# Patient Record
Sex: FEMALE | Employment: UNEMPLOYED
[De-identification: names, ages, dates, MRNs, and addresses within clinical notes are randomized per-mention and may not be internally consistent; named-entity substitution may affect disease eponyms.]

---

## 2024-05-17 ENCOUNTER — TELEPHONE (OUTPATIENT)
Facility: HOSPITAL | Age: 39
End: 2024-05-17

## 2024-05-17 ENCOUNTER — HOSPITAL ENCOUNTER (OUTPATIENT)
Facility: HOSPITAL | Age: 39
Setting detail: RECURRING SERIES
Discharge: HOME | End: 2024-05-20
Payer: OTHER GOVERNMENT

## 2024-05-17 PROCEDURE — 97110 THERAPEUTIC EXERCISES: CPT

## 2024-05-17 PROCEDURE — 97161 PT EVAL LOW COMPLEX 20 MIN: CPT

## 2024-05-17 PROCEDURE — 97535 SELF CARE MNGMENT TRAINING: CPT

## 2024-05-17 NOTE — TELEPHONE ENCOUNTER
Called to check in as it was 5+ min after eval & pt said she is on her way as she was taking care of her sick kid. Called back to make sure she could arrive no later than 10:20 otherwise she will have to RS & pt said she is getting ready to walk in the door.

## 2024-05-17 NOTE — PROGRESS NOTES
body structure, function, activity limitation and / or participation in recreation  ;Presentation:  LOW Complexity : Stable, uncomplicated  ;Clinical Decision Making:  FOTO: 62 = MEDIUM Complexity : FOTO score of 26-74 FOTO score = an established functional score where 100 = no disability  Overall Complexity Rating: LOW     Problem List: pain affecting function, decrease ROM, decrease strength, decrease ADL/functional abilities, decrease activity tolerance, and decrease flexibility/joint mobility   Treatment Plan may include any combination of the followin Therapeutic Exercise, 58374 Neuromuscular Re-Education, 34924 Manual Therapy, 06088 Therapeutic Activity, 43715 Self Care/Home Management, and (Elective Self Pay) Needle Insertion w/o Injection (1 or 2 muscles), (3+ muscles)    Patient / Family readiness to learn indicated by: asking questions, trying to perform skills, and interest  Persons(s) to be included in education: patient (P)  Barriers to Learning/Limitations: None  Measures taken if barriers to learning present: n/a  Patient Goal (s): \"Not have pain when I raise my arm.\"   Patient Self Reported Health Status: good  Rehabilitation Potential: good    Short Term Goals: To be accomplished in 4 weeks:  Patient will be independent and compliant with HEP to progress toward goals and restore functional mobility.   Eval Status: issued at eval     Patient will improve left shoulder flexion MMT to 5/5 to improve ease of OH reach.  Eval Status: 4+/5 with pain  FOTO score = an established functional score where 100 = no disability     Pt will demonstrate DWAINE to T2 on left side void of pain provocation to improve ease of daily task performance.  Eval Status: T1 with discomfort     Long Term Goals: To be accomplished in 8 weeks:  Patient will improve FOTO score to 71 points to indicate significant improvement in functional status.  Eval Status: FOTO 62  FOTO score = an established functional score where 100 = 
tolerated  []  Discharge due to :  []  Other:    Anthony Burns, PT    5/17/2024    10:20 AM    No future appointments.

## 2024-05-22 ENCOUNTER — HOSPITAL ENCOUNTER (OUTPATIENT)
Facility: HOSPITAL | Age: 39
Setting detail: RECURRING SERIES
Discharge: HOME | End: 2024-05-25
Payer: OTHER GOVERNMENT

## 2024-05-22 PROCEDURE — 97112 NEUROMUSCULAR REEDUCATION: CPT

## 2024-05-22 PROCEDURE — 97530 THERAPEUTIC ACTIVITIES: CPT

## 2024-05-22 PROCEDURE — 97110 THERAPEUTIC EXERCISES: CPT

## 2024-05-22 NOTE — PROGRESS NOTES
PHYSICAL / OCCUPATIONAL THERAPY - DAILY TREATMENT NOTE (updated )    Patient Name: Norma Pinzon    Date: 2024    : 1985  Insurance: Payor:  EAST / Plan:  EAST PRIME / Product Type: *No Product type* /      Patient  verified Yes     Visit #   Current / Total 2 16   Time   In / Out 0930 1017   Pain   In / Out 0.5 0-2   Subjective Functional Status/Changes: \"I don't have much pain at rest, but certain motions are still quite painful.\"   Changes to:  Meds, Allergies, Med Hx, Sx Hx?  If yes, update Summary List no       TREATMENT AREA =  Pain in left shoulder [M25.512]    OBJECTIVE    Therapeutic Procedures:  Tx Min Billable or 1:1 Min (if diff from Tx Min) Procedure, Rationale, Specifics   25  02210 Therapeutic Exercise (timed):  increase ROM, strength, coordination, balance, and proprioception to improve patient's ability to progress to PLOF and address remaining functional goals. (see flow sheet as applicable)     Details if applicable:       12  86619 Therapeutic Activity (timed):  use of dynamic activities replicating functional movements to increase ROM, strength, coordination, balance, and proprioception in order to improve patient's ability to progress to PLOF and address remaining functional goals.  (see flow sheet as applicable)     Details if applicable:     10  73592 Neuromuscular Re-Education (timed):  improve balance, coordination, kinesthetic sense, posture, core stability and proprioception to improve patient's ability to develop conscious control of individual muscles and awareness of position of extremities in order to progress to PLOF and address remaining functional goals. (see flow sheet as applicable)     Details if applicable:     47  [facility] Totals Reminder: bill using total billable min of TIMED therapeutic procedures (example: do not include dry needle or estim unattended, both untimed codes, in totals to left)  8-22 min = 1 unit; 23-37 min = 2 units; 38-52 min =

## 2024-05-31 ENCOUNTER — HOSPITAL ENCOUNTER (OUTPATIENT)
Facility: HOSPITAL | Age: 39
Setting detail: RECURRING SERIES
End: 2024-05-31
Payer: OTHER GOVERNMENT

## 2024-05-31 PROCEDURE — 97112 NEUROMUSCULAR REEDUCATION: CPT

## 2024-05-31 PROCEDURE — 97530 THERAPEUTIC ACTIVITIES: CPT

## 2024-05-31 PROCEDURE — 97110 THERAPEUTIC EXERCISES: CPT

## 2024-05-31 NOTE — PROGRESS NOTES
left)  8-22 min = 1 unit; 23-37 min = 2 units; 38-52 min = 3 units; 53-67 min = 4 units; 68-82 min = 5 units   Total Total       TOTAL TREATMENT TIME:        49       [x]  Patient Education billed concurrently with other procedures   [x] Review HEP    [] Progressed/Changed HEP, detail:    [] Other detail:       Objective Information/Functional Measures/Assessment    Added inferior glide stretch to program due to painful arc still present left shoulder abduction. Provided patient resistance band for HEP and written copy of all exercises. Reviewed exercises in detail and reinforced to patient importance of developing consistency with HEP.    Patient will continue to benefit from skilled PT / OT services to modify and progress therapeutic interventions, analyze and address functional mobility deficits, analyze and address ROM deficits, analyze and address strength deficits, analyze and address soft tissue restrictions, analyze and cue for proper movement patterns, analyze and modify for postural abnormalities, analyze and address imbalance/dizziness, and instruct in home and community integration to address functional deficits and attain remaining goals.    Progress toward goals / Updated goals:  []  See Progress Note/Recertification    Short Term Goals: To be accomplished in 4 weeks:  Patient will be independent and compliant with HEP to progress toward goals and restore functional mobility.   Eval Status: issued at eval  Current: patient reports consistent compliance with initial HEP every other day.   5/22/2024     Patient will improve left shoulder flexion MMT to 5/5 to improve ease of OH reach.  Eval Status: 4+/5 with pain     Pt will demonstrate DWAINE to T2 on left side void of pain provocation to improve ease of daily task performance.  Eval Status: T1 with discomfort  Current: T2 with slight discomfort. 5/31/2024     Long Term Goals: To be accomplished in 8 weeks:  Patient will improve FOTO score to 71 points to  indicate significant improvement in functional status.  Eval Status: FOTO 62  FOTO score = an established functional score where 100 = no disability     Pt will have 5/5 left shoulder ER strength to return to goals of improve ease of overhead reach and swimming.  Eval Status: 4/5 with pain     Pt will report ability to return to normal swimming void of pain provocation to improve QOL  Eval status:  pain with freestyle stroke per pt    PLAN  Yes  Continue plan of care  []  Upgrade activities as tolerated  []  Discharge due to:   []  Other:    Jeremy Cowan, PT    5/31/2024    10:58 AM    Future Appointments   Date Time Provider Department Center   6/4/2024  9:30 AM Anthony Burns, PT [facility]   6/6/2024  9:30 AM Jeremy Cowan, PT [facility]   6/11/2024  9:30 AM Anthony Burns, PT [facility]   6/13/2024  9:30 AM Anthony Burns, PT [facility]   6/18/2024  9:30 AM Anthony Burns, PT [facility]   6/20/2024  9:30 AM Anthony Burns, PT [facility]   6/25/2024  9:30 AM Anthony Burns, PT [facility]   6/27/2024  9:30 AM Anthony Burns, PT [facility]   7/2/2024  9:30 AM Anthony Burns, PT [facility]   7/9/2024  9:30 AM Anthony Burns, PT [facility]

## 2024-06-04 ENCOUNTER — HOSPITAL ENCOUNTER (OUTPATIENT)
Facility: HOSPITAL | Age: 39
Setting detail: RECURRING SERIES
Discharge: HOME | End: 2024-06-07
Payer: OTHER GOVERNMENT

## 2024-06-04 PROCEDURE — 97112 NEUROMUSCULAR REEDUCATION: CPT

## 2024-06-04 PROCEDURE — 97530 THERAPEUTIC ACTIVITIES: CPT

## 2024-06-04 PROCEDURE — 97110 THERAPEUTIC EXERCISES: CPT

## 2024-06-04 NOTE — PROGRESS NOTES
PHYSICAL / OCCUPATIONAL THERAPY - DAILY TREATMENT NOTE (updated )    Patient Name: Norma Pinzon    Date: 2024    : 1985  Insurance: Payor: [employer] EAST / Plan:  EAST PRIME / Product Type: *No Product type* /      Patient  verified Yes     Visit #   Current / Total 4 15   Time   In / Out 9:32 am 10:25 am   Pain   In / Out 1 0   Subjective Functional Status/Changes: \"Its doing better. I still get some catching when I'm raising my arm up.\"   Changes to:  Meds, Allergies, Med Hx, Sx Hx?  If yes, update Summary List no       TREATMENT AREA =  Pain in left shoulder [M25.512]    OBJECTIVE    Therapeutic Procedures:  Tx Min Billable or 1:1 Min (if diff from Tx Min) Procedure, Rationale, Specifics   29  10741 Therapeutic Exercise (timed):  increase ROM, strength, coordination, balance, and proprioception to improve patient's ability to progress to PLOF and address remaining functional goals. (see flow sheet as applicable)     Details if applicable:       12  29896 Therapeutic Activity (timed):  use of dynamic activities replicating functional movements to increase ROM, strength, coordination, balance, and proprioception in order to improve patient's ability to progress to PLOF and address remaining functional goals.  (see flow sheet as applicable)     Details if applicable:     12  85121 Neuromuscular Re-Education (timed):  improve balance, coordination, kinesthetic sense, posture, core stability and proprioception to improve patient's ability to develop conscious control of individual muscles and awareness of position of extremities in order to progress to PLOF and address remaining functional goals. (see flow sheet as applicable)     Details if applicable:     53  [facility] Totals Reminder: bill using total billable min of TIMED therapeutic procedures (example: do not include dry needle or estim unattended, both untimed codes, in totals to left)  8-22 min = 1 unit; 23-37 min = 2 units; 38-52 min = 3

## 2024-06-06 ENCOUNTER — HOSPITAL ENCOUNTER (OUTPATIENT)
Facility: HOSPITAL | Age: 39
Setting detail: RECURRING SERIES
Discharge: HOME | End: 2024-06-09
Payer: OTHER GOVERNMENT

## 2024-06-06 PROCEDURE — 97112 NEUROMUSCULAR REEDUCATION: CPT

## 2024-06-06 PROCEDURE — 97530 THERAPEUTIC ACTIVITIES: CPT

## 2024-06-06 PROCEDURE — 97110 THERAPEUTIC EXERCISES: CPT

## 2024-06-06 NOTE — PROGRESS NOTES
untimed codes, in totals to left)  8-22 min = 1 unit; 23-37 min = 2 units; 38-52 min = 3 units; 53-67 min = 4 units; 68-82 min = 5 units   Total Total       TOTAL TREATMENT TIME:        65       [x]  Patient Education billed concurrently with other procedures   [x] Review HEP    [] Progressed/Changed HEP, detail:    [] Other detail:       Objective Information/Functional Measures/Assessment    Patient reports feeling stronger and less pain. Weights were progressed with ER and added resistance band walks. Patient did not report any increase in pain and had no difficulty completing but did experience additional fatigue when moved on to I, Y, T, W. Last set reduced resistance to 0 for last set. Patient was unable to maintain proper form in final set of \"Y\" lower trapezius exercise, swinging arm rapidly due to fatigue.    Patient will continue to benefit from skilled PT / OT services to modify and progress therapeutic interventions, analyze and address functional mobility deficits, analyze and address ROM deficits, analyze and address strength deficits, analyze and address soft tissue restrictions, analyze and cue for proper movement patterns, analyze and modify for postural abnormalities, analyze and address imbalance/dizziness, and instruct in home and community integration to address functional deficits and attain remaining goals.    Progress toward goals / Updated goals:  []  See Progress Note/Recertification    Short Term Goals: To be accomplished in 4 weeks:  Patient will be independent and compliant with HEP to progress toward goals and restore functional mobility.   Eval Status: issued at eval  Current: patient reports consistent compliance with initial HEP every other day.   5/22/2024     Patient will improve left shoulder flexion MMT to 5/5 to improve ease of OH reach.  Eval Status: 4+/5 with pain     Pt will demonstrate DWAINE to T2 on left side void of pain provocation to improve ease of daily task

## 2024-06-11 ENCOUNTER — APPOINTMENT (OUTPATIENT)
Facility: HOSPITAL | Age: 39
End: 2024-06-11
Payer: OTHER GOVERNMENT

## 2024-06-13 ENCOUNTER — HOSPITAL ENCOUNTER (OUTPATIENT)
Facility: HOSPITAL | Age: 39
Setting detail: RECURRING SERIES
Discharge: HOME | End: 2024-06-16
Payer: OTHER GOVERNMENT

## 2024-06-13 PROCEDURE — 97110 THERAPEUTIC EXERCISES: CPT

## 2024-06-13 PROCEDURE — 97112 NEUROMUSCULAR REEDUCATION: CPT

## 2024-06-13 PROCEDURE — 97530 THERAPEUTIC ACTIVITIES: CPT

## 2024-06-13 NOTE — PROGRESS NOTES
PHYSICAL / OCCUPATIONAL THERAPY - DAILY TREATMENT NOTE (updated )    Patient Name: Norma Pinzon    Date: 2024    : 1985  Insurance: Payor:  EAST / Plan:  EAST PRIME / Product Type: *No Product type* /      Patient  verified Yes     Visit #   Current / Total 6 15   Time   In / Out 9:35 am 10:30 am   Pain   In / Out 0 @ rest, 2 with overhead movement 0   Subjective Functional Status/Changes: Pt reports her shoulder has been \"doing better\" but notes that she still has some slight pain when raising her arm up over head.   Changes to:  Meds, Allergies, Med Hx, Sx Hx?  If yes, update Summary List no       TREATMENT AREA =  Pain in left shoulder [M25.512]    OBJECTIVE    Therapeutic Procedures:  Tx Min Billable or 1:1 Min (if diff from Tx Min) Procedure, Rationale, Specifics   25  60538 Therapeutic Exercise (timed):  increase ROM, strength, coordination, balance, and proprioception to improve patient's ability to progress to PLOF and address remaining functional goals. (see flow sheet as applicable)     Details if applicable:       15  62320 Therapeutic Activity (timed):  use of dynamic activities replicating functional movements to increase ROM, strength, coordination, balance, and proprioception in order to improve patient's ability to progress to PLOF and address remaining functional goals.  (see flow sheet as applicable)     Details if applicable:     15  39390 Neuromuscular Re-Education (timed):  improve balance, coordination, kinesthetic sense, posture, core stability and proprioception to improve patient's ability to develop conscious control of individual muscles and awareness of position of extremities in order to progress to PLOF and address remaining functional goals. (see flow sheet as applicable)     Details if applicable:     55  [facility] Totals Reminder: bill using total billable min of TIMED therapeutic procedures (example: do not include dry needle or estim unattended, both

## 2024-06-13 NOTE — PROGRESS NOTES
[facility]  [address] [address]                                                                                      Phone: [phone]   Fax: [phone]    Progress Note  Patient name: Norma Pinzon Start of Care: 2024   Referral source: Amy Matthews PA : 1985               Medical Diagnosis: Pain in left shoulder [M25.512]      Onset Date:~3/1/2024               Treatment Diagnosis:  M25.512  LEFT SHOULDER PAIN                                          Prior Hospitalization: see medical history Provider#: [id number]   Medications: Verified on Patient Summary List      Comorbidities: hx hip dysplasia with right and x 2 left hip surgeries, 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, brain tumor 2016 with resection and some subsequent neck problems due to surgery, microdiscectomy 2019 in L/S L5-S1, breast cancer 1.5 years ago with double mastectomy with reconstruction afterwards with implants (5 surgeries for reconstruction overall)   Prior Level of Function:  functionally independent, no AD, semi-active lifestyle     Visits from Start of Care: 6    Missed Visits: 0    Updated Goals/Measure of Progress: To be achieved in 4 weeks:    Short Term Goals: To be accomplished in 4 weeks:  Patient will be independent and compliant with HEP to progress toward goals and restore functional mobility.   Eval Status: issued at eval  Current: patient reports consistent compliance with initial HEP every other day.   2024     Patient will improve left shoulder flexion MMT to 5/5 to improve ease of OH reach.  Eval Status: 4+/5 with pain     Pt will demonstrate DWAINE to T2 on left side void of pain provocation to improve ease of daily task performance.  Eval Status: T1 with discomfort  Current: T3 bilat void of pain 24     Long Term Goals: To be accomplished in 8 weeks:  Patient will improve FOTO score to 71 points to indicate significant improvement in

## 2024-06-18 ENCOUNTER — HOSPITAL ENCOUNTER (OUTPATIENT)
Facility: HOSPITAL | Age: 39
Setting detail: RECURRING SERIES
Discharge: HOME | End: 2024-06-21
Payer: OTHER GOVERNMENT

## 2024-06-18 PROCEDURE — 97112 NEUROMUSCULAR REEDUCATION: CPT

## 2024-06-18 PROCEDURE — 97110 THERAPEUTIC EXERCISES: CPT

## 2024-06-18 PROCEDURE — 97530 THERAPEUTIC ACTIVITIES: CPT

## 2024-06-18 NOTE — PROGRESS NOTES
unattended, both untimed codes, in totals to left)  8-22 min = 1 unit; 23-37 min = 2 units; 38-52 min = 3 units; 53-67 min = 4 units; 68-82 min = 5 units   Total Total     TOTAL TREATMENT TIME:        53     [x]  Patient Education billed concurrently with other procedures   [x] Review HEP    [] Progressed/Changed HEP, detail:    [] Other detail:       Objective Information/Functional Measures/Assessment    Pt tolerated treatment well with reduction of pain post treatment. Demonstrates full shoulder flexion AROM though notes minimal post shoulder pain with terminal left shoulder flexion AROM against gravity. Progressing with shoulder strengthening void of adverse response with minimal cues for technique and some signs of fatigue post treatment noted. Treatment duration limited as pt had to leave for personal reasons. Continue progressing with shoulder strength and stabilization as tolerated.    Patient will continue to benefit from skilled PT / OT services to modify and progress therapeutic interventions, analyze and address functional mobility deficits, analyze and address ROM deficits, analyze and address strength deficits, analyze and address soft tissue restrictions, analyze and cue for proper movement patterns, and instruct in home and community integration to address functional deficits and attain remaining goals.    Progress toward goals / Updated goals:  []  See Progress Note/Recertification    Updated Goals/Measure of Progress: To be achieved in 4 weeks:     Short Term Goals: To be accomplished in 4 weeks:  Patient will be independent and compliant with HEP to progress toward goals and restore functional mobility.   Eval Status: issued at eval  Current: patient reports consistent compliance with initial HEP every other day.   5/22/2024     Patient will improve left shoulder flexion MMT to 5/5 to improve ease of OH reach.  Eval Status: 4+/5 with pain  Current: 4+/5 void of pain, improving 6/18/24     Pt will

## 2024-06-20 ENCOUNTER — HOSPITAL ENCOUNTER (OUTPATIENT)
Facility: HOSPITAL | Age: 39
Setting detail: RECURRING SERIES
Discharge: HOME | End: 2024-06-23
Payer: OTHER GOVERNMENT

## 2024-06-20 PROCEDURE — 97112 NEUROMUSCULAR REEDUCATION: CPT

## 2024-06-20 PROCEDURE — 97110 THERAPEUTIC EXERCISES: CPT

## 2024-06-20 PROCEDURE — 97530 THERAPEUTIC ACTIVITIES: CPT

## 2024-06-20 NOTE — PROGRESS NOTES
PHYSICAL / OCCUPATIONAL THERAPY - DAILY TREATMENT NOTE     Patient Name: Norma Pinzon    Date: 2024    : 1985  Insurance: Payor: [employer] EAST / Plan:  EAST PRIME / Product Type: *No Product type* /      Patient  verified Yes     Visit #   Current / Total 8 15   Time   In / Out 9:20 am 10:15 am   Pain   In / Out 0 0   Subjective Functional Status/Changes: Pt reports her shoulder is doing ok today.   Changes to:  Allergies, Med Hx, Sx Hx?   yes       TREATMENT AREA =  Pain in left shoulder [M25.512]    OBJECTIVE    Therapeutic Procedures:  Tx Min Billable or 1:1 Min (if diff from Tx Min) Procedure, Rationale, Specifics   30  40988 Therapeutic Exercise (timed):  increase ROM, strength, coordination, balance, and proprioception to improve patient's ability to progress to PLOF and address remaining functional goals. (see flow sheet as applicable)    Details if applicable:       15  92566 Neuromuscular Re-Education (timed):  improve balance, coordination, kinesthetic sense, posture, core stability and proprioception to improve patient's ability to develop conscious control of individual muscles and awareness of position of extremities in order to progress to PLOF and address remaining functional goals. (see flow sheet as applicable)    Details if applicable:     10  21148 Therapeutic Activity (timed):  use of dynamic activities replicating functional movements to increase ROM, strength, coordination, balance, and proprioception in order to improve patient's ability to progress to PLOF and address remaining functional goals.  (see flow sheet as applicable)     Details if applicable:           Details if applicable:            Details if applicable:     55  [facility] Totals Reminder: bill using total billable min of TIMED therapeutic procedures (example: do not include dry needle or estim unattended, both untimed codes, in totals to left)  8-22 min = 1 unit; 23-37 min = 2 units; 38-52 min = 3 units; 53-67  no

## 2024-06-25 ENCOUNTER — HOSPITAL ENCOUNTER (OUTPATIENT)
Facility: HOSPITAL | Age: 39
Setting detail: RECURRING SERIES
Discharge: HOME | End: 2024-06-28
Payer: OTHER GOVERNMENT

## 2024-06-25 PROCEDURE — 97110 THERAPEUTIC EXERCISES: CPT

## 2024-06-25 PROCEDURE — 97530 THERAPEUTIC ACTIVITIES: CPT

## 2024-06-25 PROCEDURE — 97112 NEUROMUSCULAR REEDUCATION: CPT

## 2024-06-25 NOTE — PROGRESS NOTES
PHYSICAL / OCCUPATIONAL THERAPY - DAILY TREATMENT NOTE     Patient Name: Norma Pinzon    Date: 2024    : 1985  Insurance: Payor: [employer] EAST / Plan:  EAST PRIME / Product Type: *No Product type* /      Patient  verified Yes     Visit #   Current / Total 9 15   Time   In / Out 9:31 am 10:25 am   Pain   In / Out 0 0   Subjective Functional Status/Changes: Pt reports her shoulder is ok today, though she notes she gets a catching pain sensation in her right shoulder with performance of wall slides with resistance.   Changes to:  Allergies, Med Hx, Sx Hx?   yes       TREATMENT AREA =  Pain in left shoulder [M25.512]    OBJECTIVE    Therapeutic Procedures:  Tx Min Billable or 1:1 Min (if diff from Tx Min) Procedure, Rationale, Specifics   30  64454 Therapeutic Exercise (timed):  increase ROM, strength, coordination, balance, and proprioception to improve patient's ability to progress to PLOF and address remaining functional goals. (see flow sheet as applicable)    Details if applicable:       14  14599 Neuromuscular Re-Education (timed):  improve balance, coordination, kinesthetic sense, posture, core stability and proprioception to improve patient's ability to develop conscious control of individual muscles and awareness of position of extremities in order to progress to PLOF and address remaining functional goals. (see flow sheet as applicable)    Details if applicable:     10  88423 Therapeutic Activity (timed):  use of dynamic activities replicating functional movements to increase ROM, strength, coordination, balance, and proprioception in order to improve patient's ability to progress to PLOF and address remaining functional goals.  (see flow sheet as applicable)     Details if applicable:           Details if applicable:            Details if applicable:     54  [facility] Totals Reminder: bill using total billable min of TIMED therapeutic procedures (example: do not include dry needle or estim

## 2024-06-27 ENCOUNTER — HOSPITAL ENCOUNTER (OUTPATIENT)
Facility: HOSPITAL | Age: 39
Setting detail: RECURRING SERIES
Discharge: HOME | End: 2024-06-30
Payer: OTHER GOVERNMENT

## 2024-06-27 PROCEDURE — 97112 NEUROMUSCULAR REEDUCATION: CPT

## 2024-06-27 PROCEDURE — 97530 THERAPEUTIC ACTIVITIES: CPT

## 2024-06-27 PROCEDURE — 97110 THERAPEUTIC EXERCISES: CPT

## 2024-06-27 NOTE — PROGRESS NOTES
62  Current: will complete in upcoming appts 6/13/24  FOTO score = an established functional score where 100 = no disability     Pt will have 5/5 left shoulder ER strength to return to goals of improve ease of overhead reach and swimming.  Eval Status: 4/5 with pain   Current: 4+/5 bilat void of pain 6/13/24     Pt will report ability to return to normal swimming void of pain provocation to improve QOL  Eval status:  pain with freestyle stroke per pt  Current: not yet attempted return to swimming 6/18/24    PLAN  Yes  Continue plan of care  [x]  Upgrade activities as tolerated  []  Discharge due to :  []  Other:    Anthony Burns, PT    6/27/2024    9:47 AM    Future Appointments   Date Time Provider Department Center   7/2/2024  9:30 AM Anthony Burns, PT [facility]   7/9/2024  9:30 AM Anthony Burns, PT [facility]

## 2024-07-02 ENCOUNTER — HOSPITAL ENCOUNTER (OUTPATIENT)
Facility: HOSPITAL | Age: 39
Setting detail: RECURRING SERIES
Discharge: HOME | End: 2024-07-05
Payer: OTHER GOVERNMENT

## 2024-07-02 PROCEDURE — 97112 NEUROMUSCULAR REEDUCATION: CPT

## 2024-07-02 PROCEDURE — 97110 THERAPEUTIC EXERCISES: CPT

## 2024-07-02 PROCEDURE — 97530 THERAPEUTIC ACTIVITIES: CPT

## 2024-07-02 NOTE — PROGRESS NOTES
PHYSICAL / OCCUPATIONAL THERAPY - DAILY TREATMENT NOTE     Patient Name: Norma Pinzon    Date: 2024    : 1985  Insurance: Payor:  EAST / Plan:  [employer]IME / Product Type: *No Product type* /      Patient  verified Yes     Visit #   Current / Total 11 15   Time   In / Out 9:35 am 10:37 am   Pain   In / Out 1 0   Subjective Functional Status/Changes: \"I guess its getting better I just wish it would take less time.\"   Changes to:  Allergies, Med Hx, Sx Hx?   yes       TREATMENT AREA =  Pain in left shoulder [M25.512]    OBJECTIVE    Therapeutic Procedures:  Tx Min Billable or 1:1 Min (if diff from Tx Min) Procedure, Rationale, Specifics   25  99162 Therapeutic Exercise (timed):  increase ROM, strength, coordination, balance, and proprioception to improve patient's ability to progress to PLOF and address remaining functional goals. (see flow sheet as applicable)    Details if applicable:         36687 Neuromuscular Re-Education (timed):  improve balance, coordination, kinesthetic sense, posture, core stability and proprioception to improve patient's ability to develop conscious control of individual muscles and awareness of position of extremities in order to progress to PLOF and address remaining functional goals. (see flow sheet as applicable)    Details if applicable:       95711 Therapeutic Activity (timed):  use of dynamic activities replicating functional movements to increase ROM, strength, coordination, balance, and proprioception in order to improve patient's ability to progress to PLOF and address remaining functional goals.  (see flow sheet as applicable)     Details if applicable:           Details if applicable:            Details if applicable:     67  [facility] Totals Reminder: bill using total billable min of TIMED therapeutic procedures (example: do not include dry needle or estim unattended, both untimed codes, in totals to left)  8-22 min = 1 unit; 23-37 min = 2 units;

## 2024-07-09 ENCOUNTER — HOSPITAL ENCOUNTER (OUTPATIENT)
Facility: HOSPITAL | Age: 39
Setting detail: RECURRING SERIES
Discharge: HOME | End: 2024-07-12
Payer: OTHER GOVERNMENT

## 2024-07-09 PROCEDURE — 97530 THERAPEUTIC ACTIVITIES: CPT

## 2024-07-09 PROCEDURE — 97112 NEUROMUSCULAR REEDUCATION: CPT

## 2024-07-09 PROCEDURE — 97110 THERAPEUTIC EXERCISES: CPT

## 2024-07-09 NOTE — PROGRESS NOTES
in upcoming appts 6/13/24  FOTO score = an established functional score where 100 = no disability     Pt will have 5/5 left shoulder ER strength to return to goals of improve ease of overhead reach and swimming.  Eval Status: 4/5 with pain   Current: progressing with shoulder strength interventions, ER 5-/5 7/9/24     Pt will report ability to return to normal swimming void of pain provocation to improve QOL  Eval status:  pain with freestyle stroke per pt  Current: not yet attempted return to swimming 6/18/24    PLAN  Yes  Continue plan of care  [x]  Upgrade activities as tolerated  []  Discharge due to :  []  Other:    Anthony Burns, PT    7/9/2024    9:33 AM    No future appointments.

## 2024-07-11 ENCOUNTER — HOSPITAL ENCOUNTER (OUTPATIENT)
Facility: HOSPITAL | Age: 39
Setting detail: RECURRING SERIES
Discharge: HOME | End: 2024-07-14
Payer: OTHER GOVERNMENT

## 2024-07-11 PROCEDURE — 97110 THERAPEUTIC EXERCISES: CPT

## 2024-07-11 PROCEDURE — 97112 NEUROMUSCULAR REEDUCATION: CPT

## 2024-07-11 PROCEDURE — 97530 THERAPEUTIC ACTIVITIES: CPT

## 2024-07-11 NOTE — PROGRESS NOTES
[facility]  [address] [address]                                                                                      Phone: [phone]   Fax: [phone]    Progress Note  Patient name: Norma Pinzon Start of Care: 2024   Referral source: Amy Matthews PA : 1985               Medical Diagnosis: Pain in left shoulder [M25.512]      Onset Date:~3/1/2024               Treatment Diagnosis:  M25.512  LEFT SHOULDER PAIN                                          Prior Hospitalization: see medical history Provider#: [id number]   Medications: Verified on Patient Summary List      Comorbidities: hx hip dysplasia with right and x 2 left hip surgeries, 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, brain tumor 2016 with resection and some subsequent neck problems due to surgery, microdiscectomy 2019 in L/S L5-S1, breast cancer 1.5 years ago with double mastectomy with reconstruction afterwards with implants (5 surgeries for reconstruction overall)   Prior Level of Function:  functionally independent, no AD, semi-active lifestyle     Visits from Start of Care: 13    Missed Visits: 0    Updated Goals/Measure of Progress: To be achieved in 4 weeks:     Short Term Goals: To be accomplished in 4 weeks:  Patient will be independent and compliant with HEP to progress toward goals and restore functional mobility.   Eval Status: issued at eval  Current: patient reports more consistent compliance with HEP recently 24     Patient will improve left shoulder flexion MMT to 5/5 to improve ease of [place] reach.  Eval Status: 4+/5 with pain  Current: near met 5-/5 void of pain 24     Pt will demonstrate DWAINE to T2 on left side void of pain provocation to improve ease of daily task performance.  Eval Status: T1 with discomfort  Current: T3 bilat void of pain 24 MET     Long Term Goals: To be accomplished in 8 weeks:  Patient will improve FOTO score to 71 points to 
met 5-/5 void of pain 7/11/24     Pt will demonstrate DWAINE to T2 on left side void of pain provocation to improve ease of daily task performance.  Eval Status: T1 with discomfort  Current: T3 bilat void of pain 7/11/24 MET     Long Term Goals: To be accomplished in 8 weeks:  Patient will improve FOTO score to 71 points to indicate significant improvement in functional status.  Eval Status: FOTO 62  Current: will complete in upcoming appts 7/11/24  FOTO score = an established functional score where 100 = no disability     Pt will have 5/5 left shoulder ER strength to return to goals of improve ease of overhead reach and swimming.  Eval Status: 4/5 with pain   Current: progressing with shoulder strength interventions, ER 5/5 711/24     Pt will report ability to return to normal swimming void of pain provocation to improve QOL  Eval status:  pain with freestyle stroke per pt  Current: not yet attempted return to swimming, advised to bring conservative progression trial 7/11/24    PLAN  Yes  Continue plan of care  [x]  Upgrade activities as tolerated  []  Discharge due to :  []  Other:    Anthony Burns, PT    7/11/2024    11:15 AM    No future appointments.

## 2024-07-23 ENCOUNTER — TELEPHONE (OUTPATIENT)
Facility: HOSPITAL | Age: 39
End: 2024-07-23

## 2024-07-30 ENCOUNTER — TELEPHONE (OUTPATIENT)
Facility: HOSPITAL | Age: 39
End: 2024-07-30